# Patient Record
Sex: FEMALE | URBAN - METROPOLITAN AREA
[De-identification: names, ages, dates, MRNs, and addresses within clinical notes are randomized per-mention and may not be internally consistent; named-entity substitution may affect disease eponyms.]

---

## 2021-07-12 ENCOUNTER — APPOINTMENT (OUTPATIENT)
Dept: URBAN - METROPOLITAN AREA CLINIC 272 | Age: 61
Setting detail: DERMATOLOGY
End: 2021-07-12

## 2021-07-12 ENCOUNTER — RX ONLY (RX ONLY)
Age: 61
End: 2021-07-12

## 2021-07-12 DIAGNOSIS — L71.8 OTHER ROSACEA: ICD-10-CM

## 2021-07-12 DIAGNOSIS — L81.4 OTHER MELANIN HYPERPIGMENTATION: ICD-10-CM

## 2021-07-12 DIAGNOSIS — Z85.828 PERSONAL HISTORY OF OTHER MALIGNANT NEOPLASM OF SKIN: ICD-10-CM

## 2021-07-12 DIAGNOSIS — L57.0 ACTINIC KERATOSIS: ICD-10-CM

## 2021-07-12 PROCEDURE — OTHER COUNSELING: OTHER

## 2021-07-12 PROCEDURE — 17000 DESTRUCT PREMALG LESION: CPT

## 2021-07-12 PROCEDURE — OTHER ADDITIONAL NOTES: OTHER

## 2021-07-12 PROCEDURE — 17003 DESTRUCT PREMALG LES 2-14: CPT

## 2021-07-12 PROCEDURE — OTHER LIQUID NITROGEN: OTHER

## 2021-07-12 PROCEDURE — 99203 OFFICE O/P NEW LOW 30 MIN: CPT | Mod: 25

## 2021-07-12 ASSESSMENT — LOCATION DETAILED DESCRIPTION DERM
LOCATION DETAILED: RIGHT SUPERIOR LATERAL MALAR CHEEK
LOCATION DETAILED: RIGHT DISTAL DORSAL FOREARM
LOCATION DETAILED: SUPERIOR MID FOREHEAD
LOCATION DETAILED: LEFT INFERIOR CENTRAL MALAR CHEEK
LOCATION DETAILED: RIGHT INFERIOR CENTRAL MALAR CHEEK
LOCATION DETAILED: RIGHT PROXIMAL DORSAL FOREARM
LOCATION DETAILED: RIGHT FOREHEAD

## 2021-07-12 ASSESSMENT — LOCATION SIMPLE DESCRIPTION DERM
LOCATION SIMPLE: RIGHT FOREARM
LOCATION SIMPLE: SUPERIOR FOREHEAD
LOCATION SIMPLE: RIGHT FOREHEAD
LOCATION SIMPLE: LEFT CHEEK
LOCATION SIMPLE: RIGHT CHEEK

## 2021-07-12 ASSESSMENT — LOCATION ZONE DERM
LOCATION ZONE: ARM
LOCATION ZONE: FACE

## 2021-07-12 NOTE — PROCEDURE: ADDITIONAL NOTES
Additional Notes: Discussed v beam
Detail Level: Simple
Render Risk Assessment In Note?: no
Additional Notes: Compound cream called into [facility]

## 2021-07-12 NOTE — PROCEDURE: LIQUID NITROGEN
Consent: The patient's consent was obtained including but not limited to risks of crusting, scabbing, blistering, scarring, darker or lighter pigmentary change, recurrence, incomplete removal and infection.
Render In Bullet Format When Appropriate: No
Post-Care Instructions: I reviewed with the patient in detail post-care instructions. Patient is to wear sunprotection, and avoid picking at any of the treated lesions. Pt may apply Vaseline to crusted or scabbing areas.
Detail Level: Zone
Total Number Of Aks Treated: 8
Duration Of Freeze Thaw-Cycle (Seconds): 0

## 2022-12-08 ENCOUNTER — RX ONLY (RX ONLY)
Age: 62
End: 2022-12-08

## 2022-12-08 ENCOUNTER — APPOINTMENT (OUTPATIENT)
Dept: URBAN - METROPOLITAN AREA CLINIC 272 | Age: 62
Setting detail: DERMATOLOGY
End: 2022-12-08

## 2022-12-08 DIAGNOSIS — D485 NEOPLASM OF UNCERTAIN BEHAVIOR OF SKIN: ICD-10-CM

## 2022-12-08 DIAGNOSIS — L81.4 OTHER MELANIN HYPERPIGMENTATION: ICD-10-CM

## 2022-12-08 DIAGNOSIS — Z71.89 OTHER SPECIFIED COUNSELING: ICD-10-CM

## 2022-12-08 DIAGNOSIS — Z85.828 PERSONAL HISTORY OF OTHER MALIGNANT NEOPLASM OF SKIN: ICD-10-CM

## 2022-12-08 DIAGNOSIS — L57.8 OTHER SKIN CHANGES DUE TO CHRONIC EXPOSURE TO NONIONIZING RADIATION: ICD-10-CM

## 2022-12-08 DIAGNOSIS — D18.0 HEMANGIOMA: ICD-10-CM

## 2022-12-08 DIAGNOSIS — L82.1 OTHER SEBORRHEIC KERATOSIS: ICD-10-CM

## 2022-12-08 DIAGNOSIS — Z41.9 ENCOUNTER FOR PROCEDURE FOR PURPOSES OTHER THAN REMEDYING HEALTH STATE, UNSPECIFIED: ICD-10-CM

## 2022-12-08 DIAGNOSIS — L57.0 ACTINIC KERATOSIS: ICD-10-CM

## 2022-12-08 DIAGNOSIS — D49.2 NEOPLASM OF UNSPECIFIED BEHAVIOR OF BONE, SOFT TISSUE, AND SKIN: ICD-10-CM

## 2022-12-08 DIAGNOSIS — B35.6 TINEA CRURIS: ICD-10-CM

## 2022-12-08 DIAGNOSIS — D22 MELANOCYTIC NEVI: ICD-10-CM

## 2022-12-08 PROBLEM — D18.01 HEMANGIOMA OF SKIN AND SUBCUTANEOUS TISSUE: Status: ACTIVE | Noted: 2022-12-08

## 2022-12-08 PROBLEM — D48.5 NEOPLASM OF UNCERTAIN BEHAVIOR OF SKIN: Status: ACTIVE | Noted: 2022-12-08

## 2022-12-08 PROBLEM — D22.5 MELANOCYTIC NEVI OF TRUNK: Status: ACTIVE | Noted: 2022-12-08

## 2022-12-08 PROCEDURE — OTHER LIQUID NITROGEN: OTHER

## 2022-12-08 PROCEDURE — OTHER REASSURANCE: OTHER

## 2022-12-08 PROCEDURE — 99214 OFFICE O/P EST MOD 30 MIN: CPT | Mod: 25

## 2022-12-08 PROCEDURE — OTHER COUNSELING: OTHER

## 2022-12-08 PROCEDURE — OTHER MIPS QUALITY: OTHER

## 2022-12-08 PROCEDURE — 17004 DESTROY PREMAL LESIONS 15/>: CPT

## 2022-12-08 PROCEDURE — OTHER BIOPSY BY SHAVE METHOD: OTHER

## 2022-12-08 PROCEDURE — 11103 TANGNTL BX SKIN EA SEP/ADDL: CPT

## 2022-12-08 PROCEDURE — OTHER SUNSCREEN RECOMMENDATIONS: OTHER

## 2022-12-08 PROCEDURE — OTHER PRESCRIPTION: OTHER

## 2022-12-08 PROCEDURE — OTHER ADDITIONAL NOTES: OTHER

## 2022-12-08 PROCEDURE — 11102 TANGNTL BX SKIN SINGLE LES: CPT | Mod: 59

## 2022-12-08 RX ORDER — TRETIONIN 0.25 MG/G
CREAM TOPICAL QHS
Qty: 45 | Refills: 3 | Status: ERX | COMMUNITY
Start: 2022-12-08

## 2022-12-08 RX ORDER — TERBINAFINE HYDROCHLORIDE 250 MG/1
TABLET ORAL
Qty: 30 | Refills: 0 | Status: ERX

## 2022-12-08 RX ORDER — TERBINAFINE HYDROCHLORIDE 250 MG/1
TABLET ORAL
Qty: 30 | Refills: 0 | Status: CANCELLED | COMMUNITY
Start: 2022-12-08

## 2022-12-08 RX ORDER — ECONAZOLE NITRATE 10 MG/G
CREAM TOPICAL
Qty: 85 | Refills: 1 | Status: ERX

## 2022-12-08 RX ORDER — ECONAZOLE NITRATE 10 MG/G
CREAM TOPICAL BID
Qty: 1 | Refills: 1 | Status: CANCELLED | COMMUNITY
Start: 2022-12-08

## 2022-12-08 ASSESSMENT — LOCATION DETAILED DESCRIPTION DERM
LOCATION DETAILED: LEFT FOREHEAD
LOCATION DETAILED: RIGHT RADIAL DORSAL HAND
LOCATION DETAILED: LEFT LATERAL FOREHEAD
LOCATION DETAILED: LEFT INFERIOR LATERAL NECK
LOCATION DETAILED: EPIGASTRIC SKIN
LOCATION DETAILED: RIGHT MEDIAL FOREHEAD
LOCATION DETAILED: LEFT DISTAL DORSAL FOREARM
LOCATION DETAILED: LEFT SUPRAPUBIC SKIN
LOCATION DETAILED: LEFT DISTAL CALF
LOCATION DETAILED: LEFT INFERIOR LATERAL FOREHEAD
LOCATION DETAILED: RIGHT POPLITEAL SKIN
LOCATION DETAILED: LEFT MEDIAL SUPERIOR CHEST
LOCATION DETAILED: RIGHT INFERIOR ANTERIOR NECK
LOCATION DETAILED: RIGHT DISTAL POSTERIOR UPPER ARM
LOCATION DETAILED: RIGHT INFERIOR CENTRAL MALAR CHEEK
LOCATION DETAILED: LEFT POSTERIOR ANKLE
LOCATION DETAILED: RIGHT CENTRAL TEMPLE
LOCATION DETAILED: RIGHT SUPERIOR CENTRAL MALAR CHEEK
LOCATION DETAILED: RIGHT POSTERIOR SHOULDER
LOCATION DETAILED: RIGHT SUPERIOR UPPER BACK
LOCATION DETAILED: LEFT LATERAL TRAPEZIAL NECK
LOCATION DETAILED: RIGHT SUPERIOR MEDIAL FOREHEAD
LOCATION DETAILED: RIGHT MEDIAL SUPERIOR CHEST
LOCATION DETAILED: LEFT POSTERIOR SHOULDER
LOCATION DETAILED: LEFT INFERIOR ANTERIOR NECK
LOCATION DETAILED: RIGHT DISTAL DORSAL FOREARM
LOCATION DETAILED: LEFT LATERAL MALAR CHEEK
LOCATION DETAILED: LEFT VENTRAL DISTAL FOREARM
LOCATION DETAILED: RIGHT MID-UPPER BACK
LOCATION DETAILED: LEFT INFERIOR CENTRAL MALAR CHEEK
LOCATION DETAILED: RIGHT DISTAL CALF
LOCATION DETAILED: RIGHT PROXIMAL DORSAL FOREARM
LOCATION DETAILED: PERIUMBILICAL SKIN
LOCATION DETAILED: RIGHT ULNAR DORSAL HAND
LOCATION DETAILED: LEFT CHIN

## 2022-12-08 ASSESSMENT — LOCATION SIMPLE DESCRIPTION DERM
LOCATION SIMPLE: LEFT FOREHEAD
LOCATION SIMPLE: RIGHT UPPER BACK
LOCATION SIMPLE: RIGHT CHEEK
LOCATION SIMPLE: LEFT FOREARM
LOCATION SIMPLE: ABDOMEN
LOCATION SIMPLE: RIGHT POSTERIOR UPPER ARM
LOCATION SIMPLE: LEFT CALF
LOCATION SIMPLE: RIGHT FOREARM
LOCATION SIMPLE: LEFT CHEEK
LOCATION SIMPLE: LEFT ANKLE
LOCATION SIMPLE: GROIN
LOCATION SIMPLE: RIGHT HAND
LOCATION SIMPLE: LEFT ANTERIOR NECK
LOCATION SIMPLE: RIGHT FOREHEAD
LOCATION SIMPLE: POSTERIOR NECK
LOCATION SIMPLE: RIGHT CALF
LOCATION SIMPLE: RIGHT TEMPLE
LOCATION SIMPLE: LEFT SHOULDER
LOCATION SIMPLE: CHEST
LOCATION SIMPLE: RIGHT POPLITEAL SKIN
LOCATION SIMPLE: RIGHT ANTERIOR NECK
LOCATION SIMPLE: CHIN
LOCATION SIMPLE: RIGHT SHOULDER

## 2022-12-08 ASSESSMENT — LOCATION ZONE DERM
LOCATION ZONE: NECK
LOCATION ZONE: ARM
LOCATION ZONE: HAND
LOCATION ZONE: FACE
LOCATION ZONE: LEG
LOCATION ZONE: TRUNK

## 2022-12-08 NOTE — HPI: FULL BODY SKIN EXAMINATION
How Severe Are Your Spot(S)?: mild
What Type Of Note Output Would You Prefer (Optional)?: Standard Output
What Is The Reason For Today's Visit?: Full Body Skin Examination
What Is The Reason For Today's Visit? (Being Monitored For X): the development of new lesions
Additional History: Complains of recurrent rash to left side. Treated with OTC antifungal cream, butenafine hydrochloride cream 1%. Itches.

## 2022-12-08 NOTE — PROCEDURE: BIOPSY BY SHAVE METHOD
Anesthesia Volume In Cc (Will Not Render If 0): 0.5
Information: Selecting Yes will display possible errors in your note based on the variables you have selected. This validation is only offered as a suggestion for you. PLEASE NOTE THAT THE VALIDATION TEXT WILL BE REMOVED WHEN YOU FINALIZE YOUR NOTE. IF YOU WANT TO FAX A PRELIMINARY NOTE YOU WILL NEED TO TOGGLE THIS TO 'NO' IF YOU DO NOT WANT IT IN YOUR FAXED NOTE.
Biopsy Method: Personna blade
Additional Anesthesia Volume In Cc (Will Not Render If 0): 0
Electrodesiccation Text: The wound bed was treated with electrodesiccation after the biopsy was performed.
Validate That Anesthesia Is Not 0: No
Biopsy Type: H and E
Hemostasis: Electrocautery
Depth Of Biopsy: dermis
Dressing: bandage
Was A Bandage Applied: Yes
Curettage Text: The wound bed was treated with curettage after the biopsy was performed.
Electrodesiccation And Curettage Text: The wound bed was treated with electrodesiccation and curettage after the biopsy was performed.
Billing Type: Third-Party Bill
Size Of Lesion In Cm: 2
Wound Care: Petrolatum
Anesthesia Type: 1% lidocaine without epinephrine
Cryotherapy Text: The wound bed was treated with cryotherapy after the biopsy was performed.
Detail Level: Detailed
Post-Care Instructions: I reviewed with the patient in detail post-care instructions. Patient is to keep the biopsy site dry overnight, and then apply bacitracin twice daily until healed. Patient may apply hydrogen peroxide soaks to remove any crusting.
Silver Nitrate Text: The wound bed was treated with silver nitrate after the biopsy was performed.
Type Of Destruction Used: Curettage
Consent: Written consent was obtained and risks were reviewed including but not limited to scarring, infection, bleeding, scabbing, incomplete removal, nerve damage and allergy to anesthesia.
Size Of Lesion In Cm: 1
Notification Instructions: Patient will be notified of biopsy results. However, patient instructed to call the office if not contacted within 2 weeks.

## 2022-12-08 NOTE — PROCEDURE: ADDITIONAL NOTES
Additional Notes: Discussed fillers and Nectifirm
Detail Level: Simple
Render Risk Assessment In Note?: yes

## 2022-12-08 NOTE — PROCEDURE: LIQUID NITROGEN
Detail Level: Detailed
Show Applicator Variable?: Yes
Duration Of Freeze Thaw-Cycle (Seconds): 3
Render Note In Bullet Format When Appropriate: No
Post-Care Instructions: I reviewed with the patient in detail post-care instructions. Patient is to wear sunprotection, and avoid picking at any of the treated lesions. Pt may apply Vaseline to crusted or scabbing areas.
Number Of Freeze-Thaw Cycles: 1 freeze-thaw cycle
Consent: The patient's consent was obtained including but not limited to risks of crusting, scabbing, blistering, scarring, darker or lighter pigmentary change, recurrence, incomplete removal and infection.
Aperture Size (Optional): C
Application Tool (Optional): Liquid Nitrogen Sprayer

## 2022-12-12 ENCOUNTER — RX ONLY (RX ONLY)
Age: 62
End: 2022-12-12

## 2022-12-12 RX ORDER — TRETIONIN 0.25 MG/G
CREAM TOPICAL QHS
Qty: 45 | Refills: 3 | Status: ERX

## 2023-02-08 ENCOUNTER — APPOINTMENT (OUTPATIENT)
Dept: URBAN - METROPOLITAN AREA CLINIC 272 | Age: 63
Setting detail: DERMATOLOGY
End: 2023-02-11

## 2023-02-08 DIAGNOSIS — B35.6 TINEA CRURIS: ICD-10-CM

## 2023-02-08 DIAGNOSIS — Z41.9 ENCOUNTER FOR PROCEDURE FOR PURPOSES OTHER THAN REMEDYING HEALTH STATE, UNSPECIFIED: ICD-10-CM

## 2023-02-08 DIAGNOSIS — L82.0 INFLAMED SEBORRHEIC KERATOSIS: ICD-10-CM

## 2023-02-08 DIAGNOSIS — L65.0 TELOGEN EFFLUVIUM: ICD-10-CM

## 2023-02-08 PROCEDURE — OTHER ADDITIONAL NOTES: OTHER

## 2023-02-08 PROCEDURE — 99213 OFFICE O/P EST LOW 20 MIN: CPT | Mod: 25

## 2023-02-08 PROCEDURE — OTHER LIQUID NITROGEN: OTHER

## 2023-02-08 PROCEDURE — 17110 DESTRUCT B9 LESION 1-14: CPT

## 2023-02-08 PROCEDURE — OTHER COUNSELING: OTHER

## 2023-02-08 ASSESSMENT — LOCATION SIMPLE DESCRIPTION DERM
LOCATION SIMPLE: SCALP
LOCATION SIMPLE: RIGHT CHEEK
LOCATION SIMPLE: RIGHT FOREHEAD
LOCATION SIMPLE: LEFT THIGH
LOCATION SIMPLE: GROIN
LOCATION SIMPLE: RIGHT ANTERIOR NECK
LOCATION SIMPLE: LEFT CHEEK
LOCATION SIMPLE: RIGHT SCALP
LOCATION SIMPLE: LEFT ANTERIOR NECK

## 2023-02-08 ASSESSMENT — LOCATION DETAILED DESCRIPTION DERM
LOCATION DETAILED: RIGHT MEDIAL FOREHEAD
LOCATION DETAILED: LEFT CENTRAL MALAR CHEEK
LOCATION DETAILED: RIGHT CENTRAL MALAR CHEEK
LOCATION DETAILED: RIGHT INFERIOR ANTERIOR NECK
LOCATION DETAILED: LEFT INFERIOR ANTERIOR NECK
LOCATION DETAILED: LEFT SUPRAPUBIC SKIN
LOCATION DETAILED: LEFT SUPERIOR PARIETAL SCALP
LOCATION DETAILED: LEFT POSTERIOR LATERAL PROXIMAL THIGH
LOCATION DETAILED: RIGHT MEDIAL FRONTAL SCALP

## 2023-02-08 ASSESSMENT — LOCATION ZONE DERM
LOCATION ZONE: SCALP
LOCATION ZONE: TRUNK
LOCATION ZONE: LEG
LOCATION ZONE: NECK
LOCATION ZONE: FACE

## 2023-02-08 NOTE — PROCEDURE: ADDITIONAL NOTES
Additional Notes: Pt has been using Tretinoin in the mornings. Advised pt it needs to be applied at night. Discussed fillers again. Recommended consult with Sallei. Additional Notes: Pt has been using Tretinoin in the mornings. Advised pt it needs to be applied at night. Discussed fillers again. Recommended consult with Sallie.

## 2023-02-08 NOTE — PROCEDURE: ADDITIONAL NOTES
Additional Notes: There are several empty, closed follicles. Pt understands the unlikelihood of re-growth.

## 2023-02-08 NOTE — PROCEDURE: LIQUID NITROGEN
Show Topical Anesthesia Variable?: Yes
Spray Paint Technique: No
Medical Necessity Clause: This procedure was medically necessary because the lesions that were treated were:
Spray Paint Text: The liquid nitrogen was applied to the skin utilizing a spray paint frosting technique.
Consent: The patient's consent was obtained including but not limited to risks of crusting, scabbing, blistering, scarring, darker or lighter pigmentary change, recurrence, incomplete removal and infection.
Detail Level: Detailed
Post-Care Instructions: I reviewed with the patient in detail post-care instructions. Patient is to wear sunprotection, and avoid picking at any of the treated lesions. Pt may apply Vaseline to crusted or scabbing areas.
Aperture Size (Optional): C
Number Of Freeze-Thaw Cycles: 2 freeze-thaw cycles
Medical Necessity Information: It is in your best interest to select a reason for this procedure from the list below. All of these items fulfill various CMS LCD requirements except the new and changing color options.

## 2023-06-08 ENCOUNTER — APPOINTMENT (OUTPATIENT)
Dept: URBAN - METROPOLITAN AREA CLINIC 305 | Age: 63
Setting detail: DERMATOLOGY
End: 2023-06-10

## 2023-06-08 DIAGNOSIS — L82.1 OTHER SEBORRHEIC KERATOSIS: ICD-10-CM

## 2023-06-08 DIAGNOSIS — B35.6 TINEA CRURIS: ICD-10-CM

## 2023-06-08 DIAGNOSIS — Z85.828 PERSONAL HISTORY OF OTHER MALIGNANT NEOPLASM OF SKIN: ICD-10-CM

## 2023-06-08 DIAGNOSIS — D18.0 HEMANGIOMA: ICD-10-CM

## 2023-06-08 DIAGNOSIS — L57.8 OTHER SKIN CHANGES DUE TO CHRONIC EXPOSURE TO NONIONIZING RADIATION: ICD-10-CM

## 2023-06-08 DIAGNOSIS — L82.0 INFLAMED SEBORRHEIC KERATOSIS: ICD-10-CM

## 2023-06-08 DIAGNOSIS — L81.4 OTHER MELANIN HYPERPIGMENTATION: ICD-10-CM

## 2023-06-08 DIAGNOSIS — Z79.899 OTHER LONG TERM (CURRENT) DRUG THERAPY: ICD-10-CM

## 2023-06-08 DIAGNOSIS — L57.0 ACTINIC KERATOSIS: ICD-10-CM

## 2023-06-08 DIAGNOSIS — Z71.89 OTHER SPECIFIED COUNSELING: ICD-10-CM

## 2023-06-08 DIAGNOSIS — D22 MELANOCYTIC NEVI: ICD-10-CM

## 2023-06-08 DIAGNOSIS — L85.3 XEROSIS CUTIS: ICD-10-CM

## 2023-06-08 DIAGNOSIS — D485 NEOPLASM OF UNCERTAIN BEHAVIOR OF SKIN: ICD-10-CM

## 2023-06-08 PROBLEM — D48.5 NEOPLASM OF UNCERTAIN BEHAVIOR OF SKIN: Status: ACTIVE | Noted: 2023-06-08

## 2023-06-08 PROBLEM — D18.01 HEMANGIOMA OF SKIN AND SUBCUTANEOUS TISSUE: Status: ACTIVE | Noted: 2023-06-08

## 2023-06-08 PROBLEM — D22.9 MELANOCYTIC NEVI, UNSPECIFIED: Status: ACTIVE | Noted: 2023-06-08

## 2023-06-08 PROCEDURE — 17110 DESTRUCT B9 LESION 1-14: CPT

## 2023-06-08 PROCEDURE — OTHER HIGH RISK MEDICATION MONITORING: OTHER

## 2023-06-08 PROCEDURE — 17000 DESTRUCT PREMALG LESION: CPT | Mod: 59

## 2023-06-08 PROCEDURE — OTHER PRESCRIPTION: OTHER

## 2023-06-08 PROCEDURE — 99214 OFFICE O/P EST MOD 30 MIN: CPT | Mod: 25

## 2023-06-08 PROCEDURE — 11102 TANGNTL BX SKIN SINGLE LES: CPT | Mod: 59

## 2023-06-08 PROCEDURE — OTHER MIPS QUALITY: OTHER

## 2023-06-08 PROCEDURE — OTHER SUNSCREEN RECOMMENDATIONS: OTHER

## 2023-06-08 PROCEDURE — OTHER REASSURANCE: OTHER

## 2023-06-08 PROCEDURE — OTHER COUNSELING: OTHER

## 2023-06-08 PROCEDURE — 17003 DESTRUCT PREMALG LES 2-14: CPT | Mod: 59

## 2023-06-08 PROCEDURE — OTHER BIOPSY BY SHAVE METHOD: OTHER

## 2023-06-08 PROCEDURE — OTHER LIQUID NITROGEN: OTHER

## 2023-06-08 RX ORDER — TERBINAFINE HYDROCHLORIDE 250 MG/1
TABLET ORAL
Qty: 30 | Refills: 1 | Status: ERX | COMMUNITY
Start: 2023-06-08

## 2023-06-08 RX ORDER — CICLOPIROX OLAMINE 7.7 MG/G
CREAM TOPICAL
Qty: 90 | Refills: 3 | Status: ERX | COMMUNITY
Start: 2023-06-08

## 2023-06-08 ASSESSMENT — LOCATION SIMPLE DESCRIPTION DERM
LOCATION SIMPLE: LEFT CHEEK
LOCATION SIMPLE: LEFT SHOULDER
LOCATION SIMPLE: RIGHT NOSE
LOCATION SIMPLE: RIGHT BUTTOCK
LOCATION SIMPLE: RIGHT FOREHEAD
LOCATION SIMPLE: ABDOMEN
LOCATION SIMPLE: NECK
LOCATION SIMPLE: RIGHT CHEEK
LOCATION SIMPLE: GROIN
LOCATION SIMPLE: LOWER BACK
LOCATION SIMPLE: LEFT THIGH
LOCATION SIMPLE: RIGHT FOREARM

## 2023-06-08 ASSESSMENT — LOCATION DETAILED DESCRIPTION DERM
LOCATION DETAILED: RIGHT INFERIOR LATERAL FOREHEAD
LOCATION DETAILED: LEFT CENTRAL LATERAL NECK
LOCATION DETAILED: RIGHT DISTAL DORSAL FOREARM
LOCATION DETAILED: RIGHT SUPERIOR CENTRAL BUCCAL CHEEK
LOCATION DETAILED: INFERIOR LUMBAR SPINE
LOCATION DETAILED: RIGHT LATERAL FOREHEAD
LOCATION DETAILED: LEFT CENTRAL MANDIBULAR CHEEK
LOCATION DETAILED: LEFT INGUINAL CREASE
LOCATION DETAILED: LEFT SUPRAPUBIC SKIN
LOCATION DETAILED: RIGHT LATERAL MALAR CHEEK
LOCATION DETAILED: RIGHT NASAL SIDEWALL
LOCATION DETAILED: LEFT POSTERIOR SHOULDER
LOCATION DETAILED: LEFT ANTERIOR PROXIMAL THIGH
LOCATION DETAILED: RIGHT FOREHEAD
LOCATION DETAILED: RIGHT BUTTOCK
LOCATION DETAILED: PERIUMBILICAL SKIN

## 2023-06-08 ASSESSMENT — SEVERITY ASSESSMENT: SEVERITY: MODERATE TO SEVERE

## 2023-06-08 ASSESSMENT — LOCATION ZONE DERM
LOCATION ZONE: ARM
LOCATION ZONE: NECK
LOCATION ZONE: FACE
LOCATION ZONE: NOSE
LOCATION ZONE: TRUNK
LOCATION ZONE: LEG

## 2023-06-08 NOTE — PROCEDURE: LIQUID NITROGEN
Include Z78.9 (Other Specified Conditions Influencing Health Status) As An Associated Diagnosis?: No
Consent: The patient's consent was obtained including but not limited to risks of crusting, scabbing, blistering, scarring, darker or lighter pigmentary change, recurrence, incomplete removal and infection.
Show Topical Anesthesia Variable?: Yes
Medical Necessity Clause: This procedure was medically necessary because the lesions that were treated were:
Detail Level: Detailed
Post-Care Instructions: I reviewed with the patient in detail post-care instructions. Patient is to wear sunprotection, and avoid picking at any of the treated lesions. Pt may apply Vaseline to crusted or scabbing areas.
Medical Necessity Information: It is in your best interest to select a reason for this procedure from the list below. All of these items fulfill various CMS LCD requirements except the new and changing color options.
Spray Paint Text: The liquid nitrogen was applied to the skin utilizing a spray paint frosting technique.
Aperture Size (Optional): C
Application Tool (Optional): Liquid Nitrogen Sprayer
Duration Of Freeze Thaw-Cycle (Seconds): 0
Number Of Freeze-Thaw Cycles: 1 freeze-thaw cycle

## 2023-06-08 NOTE — PROCEDURE: HIGH RISK MEDICATION MONITORING
done Opioid Pregnancy And Lactation Text: These medications can lead to premature delivery and should be avoided during pregnancy. These medications are also present in breast milk in small amounts.

## 2023-09-14 ENCOUNTER — APPOINTMENT (OUTPATIENT)
Dept: URBAN - METROPOLITAN AREA CLINIC 305 | Age: 63
Setting detail: DERMATOLOGY
End: 2023-09-16

## 2023-09-14 DIAGNOSIS — L82.0 INFLAMED SEBORRHEIC KERATOSIS: ICD-10-CM

## 2023-09-14 DIAGNOSIS — L82.1 OTHER SEBORRHEIC KERATOSIS: ICD-10-CM

## 2023-09-14 DIAGNOSIS — D485 NEOPLASM OF UNCERTAIN BEHAVIOR OF SKIN: ICD-10-CM

## 2023-09-14 DIAGNOSIS — L29.8 OTHER PRURITUS: ICD-10-CM

## 2023-09-14 DIAGNOSIS — L30.9 DERMATITIS, UNSPECIFIED: ICD-10-CM

## 2023-09-14 PROBLEM — D48.5 NEOPLASM OF UNCERTAIN BEHAVIOR OF SKIN: Status: ACTIVE | Noted: 2023-09-14

## 2023-09-14 PROCEDURE — OTHER MIPS QUALITY: OTHER

## 2023-09-14 PROCEDURE — 17110 DESTRUCT B9 LESION 1-14: CPT | Mod: 59

## 2023-09-14 PROCEDURE — 99213 OFFICE O/P EST LOW 20 MIN: CPT | Mod: 25

## 2023-09-14 PROCEDURE — 11104 PUNCH BX SKIN SINGLE LESION: CPT

## 2023-09-14 PROCEDURE — OTHER PRESCRIPTION: OTHER

## 2023-09-14 PROCEDURE — OTHER DEFER: OTHER

## 2023-09-14 PROCEDURE — OTHER LIQUID NITROGEN: OTHER

## 2023-09-14 PROCEDURE — OTHER BIOPSY BY PUNCH METHOD: OTHER

## 2023-09-14 PROCEDURE — OTHER COUNSELING: OTHER

## 2023-09-14 PROCEDURE — OTHER BIOPSY BY SHAVE METHOD: OTHER

## 2023-09-14 PROCEDURE — 11103 TANGNTL BX SKIN EA SEP/ADDL: CPT | Mod: 59

## 2023-09-14 RX ORDER — MOMETASONE FUROATE 1 MG/G
CREAM TOPICAL
Qty: 45 | Refills: 0 | Status: ERX | COMMUNITY
Start: 2023-09-14

## 2023-09-14 ASSESSMENT — LOCATION SIMPLE DESCRIPTION DERM
LOCATION SIMPLE: CHEST
LOCATION SIMPLE: LEFT POSTERIOR THIGH
LOCATION SIMPLE: RIGHT FOREHEAD
LOCATION SIMPLE: FRONTAL SCALP
LOCATION SIMPLE: LEFT FOREHEAD
LOCATION SIMPLE: ABDOMEN

## 2023-09-14 ASSESSMENT — LOCATION DETAILED DESCRIPTION DERM
LOCATION DETAILED: MEDIAL FRONTAL SCALP
LOCATION DETAILED: LEFT SUPERIOR MEDIAL FOREHEAD
LOCATION DETAILED: RIGHT LATERAL FOREHEAD
LOCATION DETAILED: LEFT SUPERIOR FOREHEAD
LOCATION DETAILED: LEFT LATERAL ABDOMEN
LOCATION DETAILED: RIGHT SUPERIOR FOREHEAD
LOCATION DETAILED: LEFT LATERAL FOREHEAD
LOCATION DETAILED: RIGHT MEDIAL SUPERIOR CHEST
LOCATION DETAILED: LEFT DISTAL POSTERIOR THIGH

## 2023-09-14 ASSESSMENT — LOCATION ZONE DERM
LOCATION ZONE: LEG
LOCATION ZONE: FACE
LOCATION ZONE: SCALP
LOCATION ZONE: TRUNK

## 2023-09-25 ENCOUNTER — APPOINTMENT (OUTPATIENT)
Dept: URBAN - METROPOLITAN AREA CLINIC 305 | Age: 63
Setting detail: DERMATOLOGY
End: 2023-09-26

## 2023-09-25 DIAGNOSIS — B07.8 OTHER VIRAL WARTS: ICD-10-CM

## 2023-09-25 DIAGNOSIS — L30.9 DERMATITIS, UNSPECIFIED: ICD-10-CM

## 2023-09-25 DIAGNOSIS — L82.0 INFLAMED SEBORRHEIC KERATOSIS: ICD-10-CM

## 2023-09-25 DIAGNOSIS — L57.0 ACTINIC KERATOSIS: ICD-10-CM

## 2023-09-25 DIAGNOSIS — D485 NEOPLASM OF UNCERTAIN BEHAVIOR OF SKIN: ICD-10-CM

## 2023-09-25 DIAGNOSIS — L57.8 OTHER SKIN CHANGES DUE TO CHRONIC EXPOSURE TO NONIONIZING RADIATION: ICD-10-CM

## 2023-09-25 PROBLEM — D48.5 NEOPLASM OF UNCERTAIN BEHAVIOR OF SKIN: Status: ACTIVE | Noted: 2023-09-25

## 2023-09-25 PROCEDURE — 17000 DESTRUCT PREMALG LESION: CPT | Mod: 59

## 2023-09-25 PROCEDURE — OTHER LIQUID NITROGEN: OTHER

## 2023-09-25 PROCEDURE — OTHER COUNSELING: OTHER

## 2023-09-25 PROCEDURE — 99214 OFFICE O/P EST MOD 30 MIN: CPT | Mod: 25

## 2023-09-25 PROCEDURE — 17110 DESTRUCT B9 LESION 1-14: CPT

## 2023-09-25 PROCEDURE — OTHER MIPS QUALITY: OTHER

## 2023-09-25 PROCEDURE — 11102 TANGNTL BX SKIN SINGLE LES: CPT | Mod: 59

## 2023-09-25 PROCEDURE — OTHER BIOPSY BY SHAVE METHOD: OTHER

## 2023-09-25 PROCEDURE — 17003 DESTRUCT PREMALG LES 2-14: CPT | Mod: 59

## 2023-09-25 PROCEDURE — OTHER PRESCRIPTION MEDICATION MANAGEMENT: OTHER

## 2023-09-25 PROCEDURE — OTHER BENIGN DESTRUCTION: OTHER

## 2023-09-25 PROCEDURE — OTHER ADDITIONAL NOTES: OTHER

## 2023-09-25 PROCEDURE — 11103 TANGNTL BX SKIN EA SEP/ADDL: CPT | Mod: 59

## 2023-09-25 ASSESSMENT — LOCATION SIMPLE DESCRIPTION DERM
LOCATION SIMPLE: LEFT CHEEK
LOCATION SIMPLE: LEFT EAR
LOCATION SIMPLE: LEFT SHOULDER
LOCATION SIMPLE: SUPERIOR FOREHEAD
LOCATION SIMPLE: RIGHT EAR
LOCATION SIMPLE: RIGHT 5TH TOE
LOCATION SIMPLE: RIGHT FOREHEAD
LOCATION SIMPLE: NECK
LOCATION SIMPLE: RIGHT SHOULDER
LOCATION SIMPLE: LEFT FOREHEAD
LOCATION SIMPLE: CHEST
LOCATION SIMPLE: LEFT HAND
LOCATION SIMPLE: RIGHT UPPER ARM
LOCATION SIMPLE: RIGHT CHEEK

## 2023-09-25 ASSESSMENT — LOCATION DETAILED DESCRIPTION DERM
LOCATION DETAILED: RIGHT INFERIOR CENTRAL MALAR CHEEK
LOCATION DETAILED: RIGHT SUPERIOR HELIX
LOCATION DETAILED: RIGHT INFERIOR HELIX
LOCATION DETAILED: LEFT ANTERIOR SHOULDER
LOCATION DETAILED: RIGHT LATERAL PROXIMAL UPPER ARM
LOCATION DETAILED: RIGHT LATERAL SUPERIOR CHEST
LOCATION DETAILED: RIGHT INFERIOR FOREHEAD
LOCATION DETAILED: LEFT INFERIOR CENTRAL MALAR CHEEK
LOCATION DETAILED: LEFT MEDIAL SUPERIOR CHEST
LOCATION DETAILED: RIGHT FOREHEAD
LOCATION DETAILED: RIGHT MEDIAL 5TH TOE
LOCATION DETAILED: LEFT SUPERIOR POSTERIOR HELIX
LOCATION DETAILED: RIGHT LATERAL SHOULDER
LOCATION DETAILED: SUPERIOR MID FOREHEAD
LOCATION DETAILED: RIGHT SUPERIOR MEDIAL FOREHEAD
LOCATION DETAILED: LEFT INFERIOR POSTERIOR HELIX
LOCATION DETAILED: LEFT FOREHEAD
LOCATION DETAILED: RIGHT LATERAL MALAR CHEEK
LOCATION DETAILED: LEFT SUPERIOR LATERAL NECK
LOCATION DETAILED: LEFT RADIAL DORSAL HAND

## 2023-09-25 ASSESSMENT — LOCATION ZONE DERM
LOCATION ZONE: TOE
LOCATION ZONE: ARM
LOCATION ZONE: HAND
LOCATION ZONE: EAR
LOCATION ZONE: FACE
LOCATION ZONE: TRUNK
LOCATION ZONE: NECK

## 2023-09-25 NOTE — PROCEDURE: ADDITIONAL NOTES
Patient Management Risk Assessment: Moderate
Render Risk Assessment In Note?: no
Additional Notes: Will schedule TCA peel with [facility].
Detail Level: Simple

## 2023-09-25 NOTE — PROCEDURE: LIQUID NITROGEN
Applied
Render Post-Care Instructions In Note?: no
Number Of Freeze-Thaw Cycles: 1 freeze-thaw cycle
Duration Of Freeze Thaw-Cycle (Seconds): 0
Post-Care Instructions: I reviewed with the patient in detail post-care instructions. Patient is to wear sunprotection, and avoid picking at any of the treated lesions. Pt may apply Vaseline to crusted or scabbing areas.
Detail Level: Detailed
Consent: The patient's consent was obtained including but not limited to risks of crusting, scabbing, blistering, scarring, darker or lighter pigmentary change, recurrence, incomplete removal and infection.
Application Tool (Optional): Liquid Nitrogen Sprayer
Aperture Size (Optional): C
Show Applicator Variable?: Yes
Medical Necessity Clause: This procedure was medically necessary because the lesions that were treated were:
Medical Necessity Information: It is in your best interest to select a reason for this procedure from the list below. All of these items fulfill various CMS LCD requirements except the new and changing color options.
Spray Paint Text: The liquid nitrogen was applied to the skin utilizing a spray paint frosting technique.

## 2023-09-25 NOTE — PROCEDURE: BENIGN DESTRUCTION
Treatment Number (Will Not Render If 0): 0
Medical Necessity Information: It is in your best interest to select a reason for this procedure from the list below. All of these items fulfill various CMS LCD requirements except the new and changing color options.
Consent: The patient's consent was obtained including but not limited to risks of crusting, scabbing, blistering, scarring, darker or lighter pigmentary change, recurrence, incomplete removal and infection.
Detail Level: Detailed
Anesthesia Volume In Cc: 0.5
Post-Care Instructions: I reviewed with the patient in detail post-care instructions. Patient is to wear sunprotection, and avoid picking at any of the treated lesions. Pt may apply Vaseline to crusted or scabbing areas.
Render Post-Care Instructions In Note?: no
Medical Necessity Clause: This procedure was medically necessary because the lesions that were treated were:

## 2023-09-27 ENCOUNTER — APPOINTMENT (OUTPATIENT)
Dept: URBAN - METROPOLITAN AREA CLINIC 305 | Age: 63
Setting detail: DERMATOLOGY
End: 2023-09-27

## 2023-09-28 ENCOUNTER — RX ONLY (RX ONLY)
Age: 63
End: 2023-09-28

## 2023-09-28 RX ORDER — VALACYCLOVIR 500 MG/1
TABLET, FILM COATED ORAL
Qty: 30 | Refills: 2 | Status: ERX | COMMUNITY
Start: 2023-09-27

## 2023-10-17 ENCOUNTER — RX ONLY (RX ONLY)
Age: 63
End: 2023-10-17

## 2023-10-17 RX ORDER — ECONAZOLE NITRATE 10 MG/G
CREAM TOPICAL
Qty: 85 | Refills: 1 | Status: ERX | COMMUNITY
Start: 2023-10-17

## 2024-12-12 ENCOUNTER — APPOINTMENT (OUTPATIENT)
Dept: URBAN - METROPOLITAN AREA CLINIC 305 | Age: 64
Setting detail: DERMATOLOGY
End: 2024-12-12

## 2024-12-12 PROCEDURE — OTHER MIPS QUALITY: OTHER

## 2025-02-17 ENCOUNTER — APPOINTMENT (OUTPATIENT)
Dept: URBAN - METROPOLITAN AREA CLINIC 305 | Age: 65
Setting detail: DERMATOLOGY
End: 2025-02-18

## 2025-02-17 DIAGNOSIS — L57.8 OTHER SKIN CHANGES DUE TO CHRONIC EXPOSURE TO NONIONIZING RADIATION: ICD-10-CM

## 2025-02-17 DIAGNOSIS — D22 MELANOCYTIC NEVI: ICD-10-CM

## 2025-02-17 DIAGNOSIS — L57.0 ACTINIC KERATOSIS: ICD-10-CM

## 2025-02-17 DIAGNOSIS — D18.0 HEMANGIOMA: ICD-10-CM

## 2025-02-17 DIAGNOSIS — Z85.828 PERSONAL HISTORY OF OTHER MALIGNANT NEOPLASM OF SKIN: ICD-10-CM

## 2025-02-17 DIAGNOSIS — Z41.9 ENCOUNTER FOR PROCEDURE FOR PURPOSES OTHER THAN REMEDYING HEALTH STATE, UNSPECIFIED: ICD-10-CM

## 2025-02-17 DIAGNOSIS — L81.4 OTHER MELANIN HYPERPIGMENTATION: ICD-10-CM

## 2025-02-17 DIAGNOSIS — Z87.2 PERSONAL HISTORY OF DISEASES OF THE SKIN AND SUBCUTANEOUS TISSUE: ICD-10-CM

## 2025-02-17 DIAGNOSIS — L82.1 OTHER SEBORRHEIC KERATOSIS: ICD-10-CM

## 2025-02-17 DIAGNOSIS — L85.3 XEROSIS CUTIS: ICD-10-CM

## 2025-02-17 PROBLEM — D22.9 MELANOCYTIC NEVI, UNSPECIFIED: Status: ACTIVE | Noted: 2025-02-17

## 2025-02-17 PROBLEM — D18.01 HEMANGIOMA OF SKIN AND SUBCUTANEOUS TISSUE: Status: ACTIVE | Noted: 2025-02-17

## 2025-02-17 PROCEDURE — 17000 DESTRUCT PREMALG LESION: CPT

## 2025-02-17 PROCEDURE — OTHER LIQUID NITROGEN: OTHER

## 2025-02-17 PROCEDURE — OTHER ADDITIONAL NOTES: OTHER

## 2025-02-17 PROCEDURE — 99214 OFFICE O/P EST MOD 30 MIN: CPT | Mod: 25

## 2025-02-17 PROCEDURE — OTHER PRESCRIPTION: OTHER

## 2025-02-17 PROCEDURE — OTHER REASSURANCE: OTHER

## 2025-02-17 PROCEDURE — OTHER MIPS QUALITY: OTHER

## 2025-02-17 PROCEDURE — OTHER COUNSELING: OTHER

## 2025-02-17 PROCEDURE — 17003 DESTRUCT PREMALG LES 2-14: CPT

## 2025-02-17 PROCEDURE — OTHER SUNSCREEN RECOMMENDATIONS: OTHER

## 2025-02-17 RX ORDER — TRETIONIN 0.25 MG/G
CREAM TOPICAL QHS
Qty: 45 | Refills: 11 | Status: ERX | COMMUNITY
Start: 2025-02-17

## 2025-02-17 ASSESSMENT — LOCATION ZONE DERM
LOCATION ZONE: TRUNK
LOCATION ZONE: NECK
LOCATION ZONE: FACE
LOCATION ZONE: ARM

## 2025-02-17 ASSESSMENT — LOCATION SIMPLE DESCRIPTION DERM
LOCATION SIMPLE: RIGHT CLAVICULAR SKIN
LOCATION SIMPLE: RIGHT EYEBROW
LOCATION SIMPLE: RIGHT FOREHEAD
LOCATION SIMPLE: LEFT UPPER BACK
LOCATION SIMPLE: CHIN
LOCATION SIMPLE: LEFT ANTERIOR NECK
LOCATION SIMPLE: RIGHT CHEEK
LOCATION SIMPLE: LEFT CLAVICULAR SKIN
LOCATION SIMPLE: CHEST
LOCATION SIMPLE: LEFT SHOULDER
LOCATION SIMPLE: LEFT FOREHEAD

## 2025-02-17 ASSESSMENT — LOCATION DETAILED DESCRIPTION DERM
LOCATION DETAILED: LEFT CLAVICULAR SKIN
LOCATION DETAILED: RIGHT SUPERIOR CENTRAL BUCCAL CHEEK
LOCATION DETAILED: RIGHT MENTAL CREASE
LOCATION DETAILED: LEFT ANTERIOR SHOULDER
LOCATION DETAILED: LEFT INFERIOR ANTERIOR NECK
LOCATION DETAILED: RIGHT INFERIOR FOREHEAD
LOCATION DETAILED: LEFT MEDIAL SUPERIOR CHEST
LOCATION DETAILED: LEFT INFERIOR FOREHEAD
LOCATION DETAILED: RIGHT INFERIOR LATERAL FOREHEAD
LOCATION DETAILED: RIGHT LATERAL EYEBROW
LOCATION DETAILED: LEFT SUPERIOR UPPER BACK
LOCATION DETAILED: RIGHT FOREHEAD
LOCATION DETAILED: RIGHT INFERIOR LATERAL MALAR CHEEK
LOCATION DETAILED: RIGHT CLAVICULAR SKIN

## 2025-02-17 NOTE — PROCEDURE: ADDITIONAL NOTES
Render Risk Assessment In Note?: no
Patient Management Risk Assessment: Low
Detail Level: Simple
Additional Notes: Recommended another TCA peel

## 2025-03-28 ENCOUNTER — RX ONLY (RX ONLY)
Age: 65
End: 2025-03-28

## 2025-03-28 ENCOUNTER — APPOINTMENT (OUTPATIENT)
Dept: URBAN - METROPOLITAN AREA CLINIC 305 | Age: 65
Setting detail: DERMATOLOGY
End: 2025-03-28

## 2025-03-28 DIAGNOSIS — L57.0 ACTINIC KERATOSIS: ICD-10-CM

## 2025-03-28 PROCEDURE — OTHER TCA CHEMICAL PEEL: OTHER

## 2025-03-28 PROCEDURE — 17004 DESTROY PREMAL LESIONS 15/>: CPT

## 2025-03-28 PROCEDURE — OTHER CHEMICAL PEEL (PREMALIGNANT DESTRUCTION): OTHER

## 2025-03-28 RX ORDER — VALACYCLOVIR 500 MG/1
TABLET, FILM COATED ORAL
Qty: 30 | Refills: 2 | Status: ERX | COMMUNITY
Start: 2025-03-28

## 2025-03-28 ASSESSMENT — LOCATION DETAILED DESCRIPTION DERM
LOCATION DETAILED: LEFT INFERIOR CENTRAL MALAR CHEEK
LOCATION DETAILED: RIGHT INFERIOR CENTRAL MALAR CHEEK

## 2025-03-28 ASSESSMENT — LOCATION ZONE DERM: LOCATION ZONE: FACE

## 2025-03-28 ASSESSMENT — LOCATION SIMPLE DESCRIPTION DERM
LOCATION SIMPLE: RIGHT CHEEK
LOCATION SIMPLE: LEFT CHEEK

## 2025-03-28 NOTE — PROCEDURE: TCA CHEMICAL PEEL
Post Peel Care: After the procedure, the treatment area was washed with soap and water, and a post-peel cream was applied. Sun protection and post-care instructions were reviewed with the patient.
Consent: Prior to the procedure, written consent was obtained and risks were reviewed, including but not limited to: redness, peeling, blistering, pigmentary change, scarring, infection, and pain.
Prep: The treated area was degreased with pre-peel cleanser, and vaseline was applied for protection of mucous membranes.
Chemical Peel: 20% TCA
Detail Level: Zone
Post-Care Instructions: I reviewed with the patient in detail post-care instructions. Patient should avoid sun exposure and wear sun protection.
Frost (0,1+,2+,3+,4+): 1+
Number Of Coats: 1
Treatment Number: 0

## 2025-03-28 NOTE — PROCEDURE: CHEMICAL PEEL (PREMALIGNANT DESTRUCTION)
Chemical Peel: TCA 20%
Time (Mins): 1
Post-Care Instructions: I reviewed with the patient in detail post-care instructions. Patient should avoid sun exposure and wear sun protection.
Total Number Of Aks Treated: 25
Treatment Number: 0
Endpoint: light frosting
Consent: Prior to the procedure, written consent was obtained and risks were reviewed, including but not limited to: redness, peeling, blistering, pigmentary change, scarring, infection, and pain.
Detail Level: Zone

## 2025-04-16 ENCOUNTER — APPOINTMENT (OUTPATIENT)
Dept: URBAN - METROPOLITAN AREA CLINIC 305 | Age: 65
Setting detail: DERMATOLOGY
End: 2025-04-17

## 2025-04-16 DIAGNOSIS — L90.8 OTHER ATROPHIC DISORDERS OF SKIN: ICD-10-CM

## 2025-04-16 DIAGNOSIS — L57.0 ACTINIC KERATOSIS: ICD-10-CM

## 2025-04-16 DIAGNOSIS — D49.2 NEOPLASM OF UNSPECIFIED BEHAVIOR OF BONE, SOFT TISSUE, AND SKIN: ICD-10-CM

## 2025-04-16 DIAGNOSIS — R20.2 PARESTHESIA OF SKIN: ICD-10-CM

## 2025-04-16 DIAGNOSIS — L73.8 OTHER SPECIFIED FOLLICULAR DISORDERS: ICD-10-CM

## 2025-04-16 PROCEDURE — OTHER ADDITIONAL NOTES: OTHER

## 2025-04-16 PROCEDURE — OTHER TREATMENT REGIMEN: OTHER

## 2025-04-16 PROCEDURE — OTHER MIPS QUALITY: OTHER

## 2025-04-16 PROCEDURE — OTHER COUNSELING: OTHER

## 2025-04-16 PROCEDURE — 99213 OFFICE O/P EST LOW 20 MIN: CPT

## 2025-04-16 ASSESSMENT — LOCATION ZONE DERM
LOCATION ZONE: LEG
LOCATION ZONE: FACE
LOCATION ZONE: EYELID

## 2025-04-16 ASSESSMENT — LOCATION DETAILED DESCRIPTION DERM
LOCATION DETAILED: RIGHT PROXIMAL POSTERIOR THIGH
LOCATION DETAILED: RIGHT SUPERIOR LATERAL MALAR CHEEK
LOCATION DETAILED: RIGHT CENTRAL MALAR CHEEK
LOCATION DETAILED: RIGHT SUPERIOR NASAL CHEEK
LOCATION DETAILED: LEFT LATERAL SUPERIOR EYELID
LOCATION DETAILED: LEFT LATERAL MALAR CHEEK
LOCATION DETAILED: RIGHT DISTAL POSTERIOR THIGH

## 2025-04-16 ASSESSMENT — LOCATION SIMPLE DESCRIPTION DERM
LOCATION SIMPLE: LEFT CHEEK
LOCATION SIMPLE: RIGHT CHEEK
LOCATION SIMPLE: LEFT SUPERIOR EYELID
LOCATION SIMPLE: RIGHT POSTERIOR THIGH

## 2025-04-16 NOTE — PROCEDURE: TREATMENT REGIMEN
Plan: Pt stated she has upcoming appointment with neurologist. Recommended following up with pcp if neurologist cannot conclude.  I was not able to palpate anything on today’s exam. This does not appear to be skin related, but if the neurologist can’t explain the sensation, patient may need diagnostic imaging with her primary care.
Detail Level: Simple
Plan: Recommended seeing ophthalmologist or occuloplastics due to NUB being on eyelid line. Recommended seeing Dr. Sheng Comer.  There is a darker pigment to the lesion so she does need to have this removed. It could be angiomas, but the surgeon will send for diagnostic testing.
Detail Level: Detailed
Plan: Recommended continuing peels with [facility].

## 2025-04-16 NOTE — HPI: SECONDARY COMPLAINT
How Severe Is This Condition?: mild
Additional History: More than skin deep.   Patient only feels these with deep palpation. They are slightly sore only on palpation, but otherwise they’re not affecting her daily life. She does have a follow up appointment with her neurologist in the next few days so she’s going to discuss that with them.

## 2025-04-16 NOTE — PROCEDURE: ADDITIONAL NOTES
Render Risk Assessment In Note?: no
Additional Notes: Advised to watch and rtc if tender or changes in the future.
Detail Level: Simple
Additional Notes: Recommended lifting of cheeks with plastics or aesthetics.